# Patient Record
Sex: MALE | Race: WHITE
[De-identification: names, ages, dates, MRNs, and addresses within clinical notes are randomized per-mention and may not be internally consistent; named-entity substitution may affect disease eponyms.]

---

## 2019-12-09 ENCOUNTER — HOSPITAL ENCOUNTER (EMERGENCY)
Dept: HOSPITAL 59 - ER | Age: 24
Discharge: HOME | End: 2019-12-09
Payer: MEDICAID

## 2019-12-09 DIAGNOSIS — Z11.3: Primary | ICD-10-CM

## 2019-12-09 PROCEDURE — 99283 EMERGENCY DEPT VISIT LOW MDM: CPT

## 2019-12-09 NOTE — EMERGENCY DEPARTMENT RECORD
History of Present Illness





- General


Chief complaint: Male Urogenital Problem


Stated complaint: STD CHECK


Time Seen by Provider: 12/09/19 18:31


Source: Patient


Mode of Arrival: Ambulatory


Limitations: No limitations





- History of Present Illness


Initial comments: 





25 yo male presents to ED for evaluation of possible STD.  Patient reports that 

his significant other feels that he has been unfaithful, patient reports that he

has not however "wants the test to prove it to her".  Patient denies dysuria 

symptoms, rash, fevers, chills, or lesions to the penis/groin region.  Patient 

denies health problems at his baseline.


MD Complaint: Other


-: Days(s)


Radiation: None


Improves with: None


Worsens with: None


Reports: Denies other symptoms





- Related Data


Sexually active: Yes


                                Home Medications











 Medication  Instructions  Recorded  Confirmed  Last Taken


 


No Home Med [NO HOME MEDS]  12/09/19 12/09/19 Unknown











                                    Allergies











Allergy/AdvReac Type Severity Reaction Status Date / Time


 


No Known Drug Allergies Allergy   Verified 12/09/19 18:34














Travel Screening





- Travel/Exposure Within Last 30 Days


Have you traveled within the last 30 days?: No





Review of Systems


Constitutional: Denies: Chills, Fever, Malaise, Night sweats


Eyes: Denies: Eye discharge, Eye pain


ENT: Denies: Congestion, Ear pain, Epistaxis


Respiratory: Denies: Cough, Dyspnea


Cardiovascular: Denies: Chest pain, Dyspnea on exertion


Endocrine: Denies: Fatigue, Heat or cold intolerance


Gastrointestinal: Denies: Abdominal pain, Nausea, Vomiting


Genitourinary: Denies: Dysuria, Hematuria, Incontinence, Testicular pain, 

Testicular mass


Musculoskeletal: Denies: Arthralgia, Back pain


Skin: Denies: Bruising, Change in color


Neurological: Denies: Abnormal gait, Confusion


Psychiatric: Denies: Anxiety


Hematological/Lymphatic: Denies: Anemia, Blood Clots





Past Medical History





- SOCIAL HISTORY


Smoking Status: Never smoker


Alcohol Use: None


Drug Use: None





- RESPIRATORY


Hx Respiratory Disorders: No





- CARDIOVASCULAR


Hx Cardio Disorders: No





- NEURO


Hx Neuro Disorders: No





- GI


Hx GI Disorders: No





- 


Hx Genitourinary Disorders: No





- ENDOCRINE


Hx Endocrine Disorders: No





- MUSCULOSKELETAL


Hx Musculoskeletal Disorders: No





- PSYCH


Hx Psych Problems: No





- HEMATOLOGY/ONCOLOGY


Hx Hematology/Oncology Disorders: No





Family Medical History


Any Significant Family History?: No





Physical Exam





- General


General Appearance: Alert, Oriented x3, Cooperative, No acute distress


Limitations: No limitations





- Head


Head exam: Atraumatic, Normocephalic, Normal inspection


Head exam detail: negative: Abrasion, Contusion, Sherwood's sign, General 

tenderness, Hematoma, Laceration





- Eye


Eye exam: Normal appearance.  negative: Conjunctival injection, Periorbital 

swelling, Periorbital tenderness, Scleral icterus





- ENT


Ear exam: negative: Auricular hematoma, Auricular trauma


Nasal Exam: negative: Active bleeding, Discharge, Dried blood, Foreign body, 

Sinus tenderness


Mouth exam: negative: Drooling, Laceration, Muffled voice, Tongue elevation





- Neck


Neck exam: Normal inspection.  negative: Meningismus, Tenderness





- Respiratory


Respiratory exam: Normal lung sounds bilaterally.  negative: Respiratory 

distress, Rhonchi, Stridor, Wheezes





- Cardiovascular


Cardiovascular Exam: Regular rate, Normal rhythm, Normal heart sounds





- GI/Abdominal


GI/Abdominal exam: Soft.  negative: Distended, Rebound, Rigid, Tenderness





- Rectal


Rectal exam: Deferred





- 


 exam: Deferred





- Extremities


Extremities exam: Normal inspection.  negative: Pedal edema, Tenderness





- Back


Back exam: Denies: CVA tenderness (R), CVA tenderness (L)





- Neurological


Neurological exam: Alert, Normal gait, Oriented X3





- Psychiatric


Psychiatric exam: Normal affect, Normal mood





- Skin


Skin exam: Normal color.  negative: Abrasion


Type of lesion: negative: abrasion





Course





                                   Vital Signs











  12/09/19





  18:33


 


Temperature 98.0 F


 


Pulse Rate 66


 


Respiratory 20





Rate 


 


Blood Pressure 132/84


 


Pulse Ox 97














- Reevaluation(s)


Reevaluation #1: 





12/09/19 18:43


Patient denies symptoms on examination


Will send off urine for Chlamydia and gonorrhea testing


Patient appears stable for discharge at this time without receiving prophylactic

treatment based on the history provided.





Disposition


Disposition: Discharge


Clinical Impression: 


 Screen for STD (sexually transmitted disease)





Disposition: Home, Self-Care


Condition: (2) Stable


Instructions:  Sexually Transmitted Diseases (ED)


Additional Instructions: 


Return to ED if your symptoms worsen or if you have any concerns.


Follow-up with your family doctor in 3-5 days as directed.


Forms:  Patient Portal Access


Time of Disposition: 18:40





Quality





- Quality Measures


Quality Measures: N/A





- Blood Pressure Screening


Does Patient Have Any of the Following: No


Blood Pressure Classification: Pre-Hypertensive BP Reading


Systolic Measurement: 132


Diastolic Measurement: 84


Screening for High Blood Pressure: < Pre-Hypertensive BP, F/U Documented > 

[]


Pre-Hypertensive Follow-up Interventions: Referral to alternative/primary care 

provider.

## 2020-01-26 ENCOUNTER — HOSPITAL ENCOUNTER (EMERGENCY)
Dept: HOSPITAL 59 - ER | Age: 25
Discharge: HOME | End: 2020-01-26
Payer: MEDICAID

## 2020-01-26 DIAGNOSIS — R10.11: ICD-10-CM

## 2020-01-26 DIAGNOSIS — K52.9: ICD-10-CM

## 2020-01-26 DIAGNOSIS — K85.90: Primary | ICD-10-CM

## 2020-01-26 DIAGNOSIS — R11.2: ICD-10-CM

## 2020-01-26 LAB
ABSOLUTE NEUTROPHIL COUNT: 4.86
ALBUMIN SERPL-MCNC: 4.3 G/DL (ref 4–5)
ALP SERPL-CCNC: 104 U/L (ref 40–129)
ALT SERPL-CCNC: 36 U/L (ref ?–41)
ANION GAP SERPL CALC-SCNC: 12 MMOL/L (ref 7–16)
APPEARANCE UR: CLEAR
AST SERPL-CCNC: 29 U/L (ref 10–50)
BASOPHILS NFR BLD: 0.5 % (ref 0–6)
BILIRUB DIRECT SERPL-MCNC: < 0.2 MG/DL (ref 0–0.3)
BILIRUB SERPL-MCNC: 0.7 MG/DL (ref 0.2–1)
BILIRUB UR-MCNC: NEGATIVE MG/DL
BUN SERPL-MCNC: 10 MG/DL (ref 6–20)
CO2 SERPL-SCNC: 24 MMOL/L (ref 22–29)
COLOR UR: (no result)
CREAT SERPL-MCNC: 0.9 MG/DL (ref 0.7–1.2)
EOSINOPHIL NFR BLD: 2 % (ref 0–6)
ERYTHROCYTE [DISTWIDTH] IN BLOOD BY AUTOMATED COUNT: 12.7 % (ref 11.5–14.5)
EST GLOMERULAR FILTRATION RATE: > 60 ML/MIN
GLUCOSE SERPL-MCNC: 116 MG/DL (ref 74–109)
GLUCOSE UR STRIP-MCNC: NEGATIVE MG/DL
GRANULOCYTES NFR BLD: 73.7 % (ref 47–80)
HCT VFR BLD CALC: 48.9 % (ref 42–52)
HGB BLD-MCNC: 16.5 GM/DL (ref 14–18)
KETONES UR QL STRIP: NEGATIVE
LIPASE SERPL-CCNC: 102 U/L (ref 13–60)
LYMPHOCYTES NFR BLD AUTO: 14.1 % (ref 16–45)
MCH RBC QN AUTO: 29.2 PG (ref 27–33)
MCHC RBC AUTO-ENTMCNC: 33.7 G/DL (ref 32–36)
MCV RBC AUTO: 86.4 FL (ref 81–97)
MONOCYTES NFR BLD: 9.7 % (ref 0–9)
NITRITE UR QL STRIP: NEGATIVE
PLATELET # BLD: 214 K/UL (ref 130–400)
PMV BLD AUTO: 10 FL (ref 7.4–10.4)
PROT SERPL-MCNC: 7.1 G/DL (ref 6.6–8.7)
PROT UR QL STRIP: NEGATIVE
RBC # BLD AUTO: 5.66 M/UL (ref 4.4–5.7)
RBC # UR STRIP: NEGATIVE /UL
URINE LEUKOCYTE ESTERASE: NEGATIVE
UROBILINOGEN UR STRIP-ACNC: 0.2 E.U./DL (ref 0.2–1)
WBC # BLD AUTO: 6.6 K/UL (ref 4.2–12.2)

## 2020-01-26 PROCEDURE — 85025 COMPLETE CBC W/AUTO DIFF WBC: CPT

## 2020-01-26 PROCEDURE — 74176 CT ABD & PELVIS W/O CONTRAST: CPT

## 2020-01-26 PROCEDURE — 81003 URINALYSIS AUTO W/O SCOPE: CPT

## 2020-01-26 PROCEDURE — 96374 THER/PROPH/DIAG INJ IV PUSH: CPT

## 2020-01-26 PROCEDURE — 80076 HEPATIC FUNCTION PANEL: CPT

## 2020-01-26 PROCEDURE — 96375 TX/PRO/DX INJ NEW DRUG ADDON: CPT

## 2020-01-26 PROCEDURE — 83690 ASSAY OF LIPASE: CPT

## 2020-01-26 PROCEDURE — 96361 HYDRATE IV INFUSION ADD-ON: CPT

## 2020-01-26 PROCEDURE — 80048 BASIC METABOLIC PNL TOTAL CA: CPT

## 2020-01-26 PROCEDURE — 99284 EMERGENCY DEPT VISIT MOD MDM: CPT

## 2020-01-26 RX ADMIN — SODIUM CHLORIDE SCH MLS/HR: 0.9 INJECTION, SOLUTION INTRAVENOUS at 17:41

## 2020-01-26 RX ADMIN — SODIUM CHLORIDE SCH MLS/HR: 0.9 INJECTION, SOLUTION INTRAVENOUS at 18:16

## 2020-01-26 NOTE — EMERGENCY DEPARTMENT RECORD
History of Present Illness





- General


Chief Complaint: Abdominal Pain


Stated Complaint: ABD,BACK PAIN


Time Seen by Provider: 20 16:57


Source: Patient


Mode of Arrival: Ambulatory





- History of Present Illness


Onset/Timin


-: Days(s)


Location: Diffuse


Radiation: Back


Severity: Moderate


Severity scale (1-10): 6


Quality: Aching, Cramping


Consistency: Constant





- Related Data


                                  Previous Rx's











 Medication  Instructions  Recorded


 


Hydrocodone/Acetaminophen [Norco 1 each PO Q6HR #10 tablet 20





5-325 Tablet]  











                                    Allergies











Allergy/AdvReac Type Severity Reaction Status Date / Time


 


No Known Drug Allergies Allergy   Verified 20 16:46














Travel Screening





- Travel/Exposure Within Last 30 Days


Have you traveled within the last 30 days?: No





- Travel/Exposure Within Last Year


Have you traveled outside the [place] in the last year?: No





- Additonal Travel Details


Have you been exposed to anyone with a communicable illness?: No





- Travel Symptoms


Symptom Screening: None





Review of Systems


Constitutional: Reports: As per HPI.  Denies: Chills, Fever, Malaise, Night 

sweats, Weakness, Weight change


Eyes: Reports: As per HPI.  Denies: Eye discharge, Eye pain, Photophobia, Vision

change


ENT: Reports: As per HPI.  Denies: Congestion, Dental pain, Ear pain, Epistaxis,

Hearing loss, Throat pain


Respiratory: Reports: As per HPI.  Denies: Cough, Dyspnea, Hemoptysis, Stridor, 

Wheezes


Cardiovascular: Reports: As per HPI.  Denies: Arrhythmia, Chest pain, Dyspnea on

exertion, Edema, Murmurs, Orthopnea, Palpitations, Paroxysmal nocturnal dyspnea,

Rheumatic Fever, Syncope


Endocrine: Reports: As per HPI.  Denies: Fatigue, Heat or cold intolerance, 

Polydipsia, Polyuria


Gastrointestinal: Reports: As per HPI, Abdominal pain, Nausea, Vomiting.  

Denies: Constipation, Diarrhea, Hematemesis, Hematochezia, Melena


Genitourinary: Reports: As per HPI.  Denies: Dysuria, Frequency, Hematuria, 

Incontinence, Retention, Testicular pain, Testicular mass, Urgency


Musculoskeletal: Reports: As per HPI.  Denies: Arthralgia, Back pain, Gout, 

Joint swelling, Myalgia, Neck pain


Skin: Reports: As per HPI.  Denies: Bruising, Change in color, Change in 

hair/nails, Lesions, Pruritus, Rash


Neurological: Reports: As per HPI.  Denies: Abnormal gait, Confusion, Headache, 

Numbness, Paresthesias, Seizure, Tingling, Tremors, Vertigo, Weakness


Psychiatric: Reports: As per HPI.  Denies: Anxiety, Auditory hallucinations, 

Depression, Homicidal thoughts, Suicidal thoughts, Visual hallucinations


Hematological/Lymphatic: Reports: As per HPI.  Denies: Anemia, Blood Clots, Easy

bleeding, Easy bruising, Swollen glands





Past Medical History





- SOCIAL HISTORY


Smoking Status: Never smoker


Alcohol Use: Rare, Occasional


Drug Use: None





- RESPIRATORY


Hx Respiratory Disorders: No





- CARDIOVASCULAR


Hx Cardio Disorders: No





- NEURO


Hx Neuro Disorders: No





- GI


Hx GI Disorders: No





- 


Hx Genitourinary Disorders: No





- ENDOCRINE


Hx Endocrine Disorders: No





- MUSCULOSKELETAL


Hx Musculoskeletal Disorders: No





- PSYCH


Hx Psych Problems: No





- HEMATOLOGY/ONCOLOGY


Hx Hematology/Oncology Disorders: No





Family Medical History


Any Significant Family History?: Yes





Course





                                   Vital Signs











  20





  16:41 18:32


 


Temperature 97.6 F 98.0 F


 


Pulse Rate 91 H 


 


Pulse Rate [  55 L





Pulse Ox Probe]  


 


Respiratory 16 16





Rate  


 


Blood Pressure 139/78 


 


Blood Pressure  119/72





[Left Arm]  


 


Pulse Ox 96 100














Medical Decision Making





- Lab Data


Result diagrams: 


                                 20 16:55





                                 20 16:55





                                   Lab Results











  20 Range/Units





  16:55 16:55 17:02 


 


WBC  6.6    (4.2-12.2)  K/uL


 


RBC  5.66    (4.40-5.70)  M/uL


 


Hgb  16.5    (14.0-18.0)  gm/dl


 


Hct  48.9    (42.0-52.0)  %


 


MCV  86.4    (81-97)  fl


 


MCH  29.2    (27-33)  pg


 


MCHC  33.7    (32-36)  g/dl


 


RDW  12.7    (11.5-14.5)  %


 


Plt Count  214    (130-400)  K/uL


 


MPV  10.0    (7.4-10.4)  fl


 


Gran %  73.7    (47-80)  %


 


Lymphocytes %  14.1 L    (16-45)  %


 


Monocytes %  9.7 H    (0-9)  %


 


Eosinophils %  2.0    (0-6)  %


 


Basophils %  0.5    (0-6)  %


 


Absolute Neutrophils  4.86    


 


Sodium   140   (136-145)  mmol/L


 


Potassium   3.8   (3.4-4.5)  mmol/L


 


Chloride   104   ()  mmol/L


 


Carbon Dioxide   24.0   (22-29)  mmol/L


 


Anion Gap   12.0   (7-16)  


 


BUN   10   (6-20)  mg/dL


 


Creatinine   0.9   (0.7-1.2)  mg/dL


 


Estimated GFR   > 60   mL/min


 


Random Glucose   116 H   ()  mg/dL


 


Calcium   8.9   (8.6-10.0)  mg/dL


 


Total Bilirubin   0.70   (0.2-1.0)  mg/dL


 


Direct Bilirubin   < 0.2   (0-0.3)  mg/dL


 


AST   29   (10.0-50.0)  U/L


 


ALT   36   (<41)  U/L


 


Alkaline Phosphatase   104   ()  U/L


 


Total Protein   7.1   (6.6-8.7)  g/dL


 


Albumin   4.3   (4.0-5.0)  g/dL


 


Lipase   102 H   (13-60)  U/L


 


Urine Color    Orange H  


 


Urine Appearance    Clear  


 


Urine pH    6.0  (5.0-8.0)  


 


Ur Specific Gravity    1.030  (1.002-1.030)  


 


Urine Protein    Negative  (NEGATIVE)  


 


Urine Glucose (UA)    Negative  (NEGATIVE)  


 


Urine Ketones    Negative  (NEGATIVE)  


 


Urine Blood    Negative  (NEGATIVE)  


 


Urine Nitrite    Negative  (NEGATIVE)  


 


Urine Bilirubin    Negative  (NEGATIVE)  


 


Urine Urobilinogen    0.2  (0.20 - 1.00)  E.U./dL


 


Ur Leukocyte Esterase    Negative  (NEGATIVE)  














Disposition


Clinical Impression: 


 Gastroenteritis





Abdominal pain


Qualifiers:


 Abdominal location: right upper quadrant Qualified Code(s): R10.11 - Right 

upper quadrant pain





Vomiting


Qualifiers:


 Vomiting type: unspecified Vomiting Intractability: non-intractable Nausea 

presence: with nausea Qualified Code(s): R11.2 - Nausea with vomiting, 

unspecified





Pancreatitis


Qualifiers:


 Chronicity: acute Pancreatitis type: unspecified pancreatitis type Acute 

pancreatitis complication: no infection or necrosis Qualified Code(s): K85.90 - 

Acute pancreatitis without necrosis or infection, unspecified





Disposition: Home, Self-Care


Condition: (2) Stable


Instructions:  Pancreatitis (ED), Gastroenteritis (ED)


Additional Instructions: 


follow up with family Dr in 4 days


nothing by mouth for 4 hours and than clear liquids for 24 hours


than bland foods


tylenol or motrin for pain





Prescriptions: 


Hydrocodone/Acetaminophen [Norco 5-325 Tablet] 1 each PO Q6HR #10 tablet


Forms:  Patient Portal Access





Quality





- Quality Measures


Quality Measures: N/A





- Blood Pressure Screening


Does Patient Have Any of the Following: No


Blood Pressure Classification: Pre-Hypertensive BP Reading


Systolic Measurement: 139


Diastolic Measurement: 78


Screening for High Blood Pressure: < Pre-Hypertensive BP, F/U Documented > 

[]


Pre-Hypertensive Follow-up Interventions: Referral to alternative/primary care 

provider.

## 2020-01-26 NOTE — EMERGENCY DEPARTMENT RECORD
History of Present Illness





- General


Chief Complaint: Abdominal Pain


Stated Complaint: ABD,BACK PAIN


Time Seen by Provider: 20 16:57


Source: Patient


Mode of Arrival: Ambulatory





- History of Present Illness


Initial Comments: 


abdominal pain and vomiting which started yesterday and he has vomited times 10 

with one normal BM today and his wife and family has had vomiting and abd pain 

and diarrhea. No past surguries. No medical problems no drugs or alcohol no cigs





Onset/Timin


-: Days(s)


Location: Diffuse


Radiation: Back


Severity: Moderate


Severity scale (1-10): 6


Quality: Aching, Cramping


Consistency: Constant





- Related Data


                                    Allergies











Allergy/AdvReac Type Severity Reaction Status Date / Time


 


No Known Drug Allergies Allergy   Verified 20 16:46














Travel Screening





- Travel/Exposure Within Last 30 Days


Have you traveled within the last 30 days?: No





- Travel/Exposure Within Last Year


Have you traveled outside the [place] in the last year?: No





- Additonal Travel Details


Have you been exposed to anyone with a communicable illness?: No





- Travel Symptoms


Symptom Screening: None





Review of Systems


Reviewed: No additional complaints except as noted below


Constitutional: Reports: As per HPI.  Denies: Chills, Fever, Malaise, Night 

sweats, Weakness, Weight change


Eyes: Reports: As per HPI.  Denies: Eye discharge, Eye pain, Photophobia, Vision

change


ENT: Reports: As per HPI.  Denies: Congestion, Dental pain, Ear pain, Epistaxis,

Hearing loss, Throat pain


Respiratory: Reports: As per HPI.  Denies: Cough, Dyspnea, Hemoptysis, Stridor, 

Wheezes


Cardiovascular: Reports: As per HPI.  Denies: Arrhythmia, Chest pain, Dyspnea on

exertion, Edema, Murmurs, Orthopnea, Palpitations, Paroxysmal nocturnal dyspnea,

Rheumatic Fever, Syncope


Endocrine: Reports: As per HPI.  Denies: Fatigue, Heat or cold intolerance, 

Polydipsia, Polyuria


Gastrointestinal: Reports: As per HPI, Abdominal pain, Nausea, Vomiting.  

Denies: Constipation, Diarrhea, Hematemesis, Hematochezia, Melena


Genitourinary: Reports: As per HPI.  Denies: Dysuria, Frequency, Hematuria, 

Incontinence, Retention, Testicular pain, Testicular mass, Urgency


Musculoskeletal: Reports: As per HPI.  Denies: Arthralgia, Back pain, Gout, 

Joint swelling, Myalgia, Neck pain


Skin: Reports: As per HPI.  Denies: Bruising, Change in color, Change in 

hair/nails, Lesions, Pruritus, Rash


Neurological: Reports: As per HPI.  Denies: Abnormal gait, Confusion, Headache, 

Numbness, Paresthesias, Seizure, Tingling, Tremors, Vertigo, Weakness


Psychiatric: Reports: As per HPI.  Denies: Anxiety, Auditory hallucinations, 

Depression, Homicidal thoughts, Suicidal thoughts, Visual hallucinations


Hematological/Lymphatic: Reports: As per HPI.  Denies: Anemia, Blood Clots, Easy

bleeding, Easy bruising, Swollen glands





Past Medical History





- SOCIAL HISTORY


Smoking Status: Never smoker


Alcohol Use: Rare, Occasional


Drug Use: None





- RESPIRATORY


Hx Respiratory Disorders: No





- CARDIOVASCULAR


Hx Cardio Disorders: No





- NEURO


Hx Neuro Disorders: No





- GI


Hx GI Disorders: No





- 


Hx Genitourinary Disorders: No





- ENDOCRINE


Hx Endocrine Disorders: No





- MUSCULOSKELETAL


Hx Musculoskeletal Disorders: No





- PSYCH


Hx Psych Problems: No





- HEMATOLOGY/ONCOLOGY


Hx Hematology/Oncology Disorders: No





Family Medical History


Any Significant Family History?: Yes





Physical Exam





- General


General Appearance: Alert, Oriented x3, Cooperative, No acute distress





- Head


Head exam: Normal inspection





- Eye


Eye exam: Normal appearance, PERRL


Pupils: Normal accommodation





- ENT


ENT exam: Normal exam, Mucous membranes moist, Normal external ear exam, Normal 

orophraynx, TM's normal bilaterally


Ear exam: Normal external inspection.  negative: External canal tenderness


Nasal Exam: Normal inspection.  negative: Discharge, Sinus tenderness


Mouth exam: Normal external inspection, Tongue normal


Teeth exam: Normal inspection.  negative: Dental caries


Throat exam: Normal inspection.  negative: Tonsillar erythema, Tonsillar exudate





- Neck


Neck exam: Normal inspection, Full ROM.  negative: Tenderness





- Respiratory


Respiratory exam: Normal lung sounds bilaterally.  negative: Respiratory 

distress





- Cardiovascular


Cardiovascular Exam: Regular rate, Normal rhythm, Normal heart sounds





- GI/Abdominal


GI/Abdominal exam: Soft, Guarding (right upper quad), Hypoactive bowel sounds, 

Tenderness.  negative: Distended, Rebound, Rigid





- Rectal


Rectal exam: Deferred





- 


 exam: Deferred





- Extremities


Extremities exam: Normal inspection, Full ROM, Normal capillary refill.  

negative: Tenderness





- Back


Back exam: Reports: Normal inspection, Full ROM.  Denies: Muscle spasm, Rash 

noted, Tenderness





- Neurological


Neurological exam: Alert, Normal gait, Oriented X3, Reflexes normal





- Psychiatric


Psychiatric exam: Normal affect, Normal mood





- Skin


Skin exam: Dry, Intact, Normal color, Warm





Course





                                   Vital Signs











  20





  16:41


 


Temperature 97.6 F


 


Pulse Rate 91 H


 


Respiratory 16





Rate 


 


Blood Pressure 139/78


 


Pulse Ox 96














- Reevaluation(s)


Reevaluation #1: 


last time he had any alcohol was 20 18:15





Reevaluation #2: 


feeling better


20 18:33





Reevaluation #3: 


feeling better


20 18:34








Medical Decision Making





- Data Complexity


MDM Data: Labs Ordered and/or Reviewed (WBC6,600, lipase 102 elevated), X-Ray 

Ordered and/or Reviewed (CT scan negative)





- Lab Data


Result diagrams: 


                                 20 16:55





                                 20 16:55





Disposition


Clinical Impression: 


 Gastroenteritis





Abdominal pain


Qualifiers:


 Abdominal location: right upper quadrant Qualified Code(s): R10.11 - Right 

upper quadrant pain





Vomiting


Qualifiers:


 Vomiting type: unspecified Vomiting Intractability: non-intractable Nausea 

presence: with nausea Qualified Code(s): R11.2 - Nausea with vomiting, 

unspecified





Pancreatitis


Qualifiers:


 Chronicity: acute Pancreatitis type: unspecified pancreatitis type Acute 

pancreatitis complication: no infection or necrosis Qualified Code(s): K85.90 - 

Acute pancreatitis without necrosis or infection, unspecified





Disposition: Home, Self-Care


Condition: (2) Stable


Instructions:  Pancreatitis (ED), Gastroenteritis (ED)


Additional Instructions: 


follow up with family Dr in 4 days


nothing by mouth for 4 hours and than clear liquids for 24 hours


than bland foods


tylenol or motrin for pain





Forms:  Patient Portal Access


Time of Disposition: 18:33





Quality





- Quality Measures


Quality Measures: N/A





- Blood Pressure Screening


Does Patient Have Any of the Following: No


Blood Pressure Classification: Pre-Hypertensive BP Reading


Systolic Measurement: 139


Diastolic Measurement: 78


Screening for High Blood Pressure: < Pre-Hypertensive BP, F/U Documented > 

[]


Pre-Hypertensive Follow-up Interventions: Referral to alternative/primary care 

provider.

## 2020-01-26 NOTE — CT SCAN REPORT
EXAMINATION: ABDOMEN/PELVIS WO CONTRAST

EXAM DATE:1/26/2020 5:31 PM



TECHNIQUE: Spiral CT images were obtained from the lung bases to the ischial tuberosities without int
ravenous contrast.  Sagittal and coronal 2-D reformats were made from source images. Oral contrast: N
o



INDICATION:  abd pain right upper quad

COMPARISON:  None

_________________________



FINDINGS:



CT Abdomen:  Evaluation of the solid abdominal organs and vascular structures is limited without intr
avenous contrast.



Liver:  The liver is normal in size and morphology.  No focal liver lesions.



Bile ducts:  Normal caliber bile ducts.  

Gallbladder:  No calcified gallstones or other abnormal finding.   



Pancreas:  No focal lesions or peripancreatic inflammation.   No dilation of the main pancreatic duct
.



Spleen:  Normal size



Adrenals:  No mass or other abnormality



Kidneys and Ureters:  No urinary tract stones or hydronephrosis.  No focal renal lesions.



GI:  The bowel is unremarkable.  Normal appendix.   



Vasculature:  Unremarkable.  No aneurysmal dilation of the abdominal aorta.



Lymph Nodes:  No lymphadenopathy.



Abdominal Wall:  Unremarkable.



Peritoneal Cavity:  No free intraperitoneal fluid or gas.

Retroperitoneum:  Unremarkable.



Skeletal:  Unremarkable.



Lung bases:  The lung bases are unremarkable.  





CT Pelvis (In addition to findings described above.):                               



Bladder:  The bladder is underdistended, which limits evaluation.







Lymph nodes:  No lymphadenopathy.



_________________________



IMPRESSION:

No urinary tract stones or acute abnormality to explain the patient's abdominal pain.



Dictated by: Kurt Valle MD on 1/26/2020 5:41 PM.

Electronically signed by: Kurt Valle MD on 1/26/2020 5:46 PM.

## 2020-02-24 ENCOUNTER — HOSPITAL ENCOUNTER (EMERGENCY)
Dept: HOSPITAL 59 - ER | Age: 25
Discharge: HOME | End: 2020-02-24
Payer: MEDICAID

## 2020-02-24 DIAGNOSIS — R11.2: Primary | ICD-10-CM

## 2020-02-24 DIAGNOSIS — J02.9: ICD-10-CM

## 2020-02-24 DIAGNOSIS — R05: ICD-10-CM

## 2020-02-24 LAB
APPEARANCE UR: CLEAR
BILIRUB UR-MCNC: NEGATIVE MG/DL
COLOR UR: YELLOW
FLUBV AG SPEC QL IA: NEGATIVE
GLUCOSE UR STRIP-MCNC: NEGATIVE MG/DL
KETONES UR QL STRIP: NEGATIVE
LEAD BLD-MCNC: NEGATIVE UG/DL
NITRITE UR QL STRIP: NEGATIVE
PROT UR QL STRIP: NEGATIVE
RBC # UR STRIP: NEGATIVE /UL
URINE LEUKOCYTE ESTERASE: NEGATIVE
UROBILINOGEN UR STRIP-ACNC: 0.2 E.U./DL (ref 0.2–1)

## 2020-02-24 PROCEDURE — 81003 URINALYSIS AUTO W/O SCOPE: CPT

## 2020-02-24 PROCEDURE — 87880 STREP A ASSAY W/OPTIC: CPT

## 2020-02-24 PROCEDURE — 87400 INFLUENZA A/B EACH AG IA: CPT

## 2020-02-24 PROCEDURE — 99283 EMERGENCY DEPT VISIT LOW MDM: CPT

## 2020-02-24 NOTE — EMERGENCY DEPARTMENT RECORD
History of Present Illness





- General


Chief complaint: ENT


Stated complaint: SORE THROAT,VOMITING,COUGH


Time Seen by Provider: 20 17:19


Source: Patient


Mode of Arrival: Ambulatory


Limitations: No limitations





- History of Present Illness


Initial comments: 


The patient is here with a 2 day hx of cough, ST and vomiting with coughing. He 

denies any AP, back pain, fever, diarrhea, or sputum production. The patient 

also has had no SOB, HA, or CP. 





MD complaint: Sore throat, Other


Onset/Timin


-: Days(s)


Associated Symptoms: Cough, Sore throat





- Related Data


                                  Previous Rx's











 Medication  Instructions  Recorded


 


Ondansetron [Zofran Odt] 4 mg SL .Q4-6H PRN #12 tab.rapdis 20











                                    Allergies











Allergy/AdvReac Type Severity Reaction Status Date / Time


 


No Known Drug Allergies Allergy   Verified 20 17:04














Travel/Exposure Screening





- Travel/Exposure Within Last 30 Days


Have you traveled within the last 30 days?: No





- Additonal Travel/Exposure Details


Have you been exposed to anyone with a communicable illness?: No





Review of Systems


Constitutional: Reports: Malaise.  Denies: Chills, Fever


Eyes: Denies: Eye discharge


ENT: Reports: Congestion


Respiratory: Reports: Cough.  Denies: Dyspnea





Past Medical History





- SOCIAL HISTORY


Smoking Status: Never smoker


Alcohol Use: None


Drug Use: None





- RESPIRATORY


Hx Respiratory Disorders: No





- CARDIOVASCULAR


Hx Cardio Disorders: No





- NEURO


Hx Neuro Disorders: No





- GI


Hx GI Disorders: No





- 


Hx Genitourinary Disorders: No





- ENDOCRINE


Hx Endocrine Disorders: No





- MUSCULOSKELETAL


Hx Musculoskeletal Disorders: No





- PSYCH


Hx Psych Problems: No





- HEMATOLOGY/ONCOLOGY


Hx Hematology/Oncology Disorders: No





Family Medical History


Any Significant Family History?: No





Physical Exam





- General


General Appearance: Alert, Oriented x3, Cooperative, No acute distress





- Head


Head exam: Atraumatic, Normocephalic, Normal inspection





- Eye


Eye exam: Normal appearance, PERRL





- ENT


Throat exam: Tonsillar erythema (very mild.).  negative: Normal inspection, 

Tonsillomegaly, Tonsillar exudate





- Neck


Neck exam: Normal inspection, Full ROM.  negative: Tenderness





- Respiratory


Respiratory exam: Normal lung sounds bilaterally.  negative: Respiratory distr

ess





- Cardiovascular


Cardiovascular Exam: Regular rate, Normal rhythm, Normal heart sounds





- GI/Abdominal


GI/Abdominal exam: Soft, Normal bowel sounds.  negative: Distended, Guarding, 

Rebound, Rigid, Tenderness





- Extremities


Extremities exam: Normal inspection, Full ROM, Normal capillary refill.  

negative: Tenderness





- Neurological


Neurological exam: Alert, Normal gait.  negative: Abnormal gait, Motor sensory 

deficit





Course





                                   Vital Signs











  20





  17:02


 


Temperature 98.7 F


 


Pulse Rate 76


 


Respiratory 20





Rate 


 


Blood Pressure 136/72


 


Pulse Ox 99














- Reevaluation(s)


Reevaluation #1: 


The patient is doing better after the Zofran and denies any nausea. He is 

drinking water well with no significant pain or discomfort. I did discuss the 

fact the tests for the Flu and Strep were neg and also his UA did not 

demonstrate any dehydration. The patient is to be off work for 2 days and use 

the Zofran for nausea. He is to see his PCP later this week if not better and to

return to the ER for any worsening symptoms.


20 17:55














Medical Decision Making





- Data Complexity


MDM Data: Labs Ordered and/or Reviewed (Flu and Strep: Neg. )





- Lab Data





                                   Lab Results











  20 Range/Units





  Unknown 


 


Group A Strep Screen  Negative  (NEGATIVE)  














Disposition


Disposition: Discharge


Clinical Impression: 


Vomiting


Qualifiers:


 Vomiting type: unspecified Vomiting Intractability: non-intractable Nausea 

presence: with nausea Qualified Code(s): R11.2 - Nausea with vomiting, 

unspecified





Disposition: Home, Self-Care


Condition: (2) Stable


Instructions:  Acute Nausea and Vomiting (ED)


Additional Instructions: 


Please use Tylenol for pain and please use Zofran for nausea. Please see your 

family doctor later this week for recheck and return to the ER for any worsening

issues.


Prescriptions: 


Ondansetron [Zofran Odt] 4 mg SL .Q4-6H PRN #12 tab.rapdis


 PRN Reason: Nausea


Forms:  Patient Portal Access


Time of Disposition: 18:07





Quality





- Quality Measures


Quality Measures: N/A





- Blood Pressure Screening


View Details: Yes


Does Patient Have Any of the Following: No


Blood Pressure Classification: Pre-Hypertensive BP Reading


Systolic Measurement: 136


Diastolic Measurement: 72


Screening for High Blood Pressure: < Pre-Hypertensive BP, F/U Documented > 

[]


Pre-Hypertensive Follow-up Interventions: Referral to alternative/primary care 

provider.